# Patient Record
Sex: MALE | Race: WHITE | NOT HISPANIC OR LATINO | Employment: UNEMPLOYED | URBAN - METROPOLITAN AREA
[De-identification: names, ages, dates, MRNs, and addresses within clinical notes are randomized per-mention and may not be internally consistent; named-entity substitution may affect disease eponyms.]

---

## 2018-04-25 ENCOUNTER — OFFICE VISIT CONVERTED (OUTPATIENT)
Dept: UROLOGY | Facility: CLINIC | Age: 38
End: 2018-04-25
Attending: UROLOGY

## 2021-05-16 VITALS
WEIGHT: 217 LBS | SYSTOLIC BLOOD PRESSURE: 110 MMHG | HEIGHT: 70 IN | DIASTOLIC BLOOD PRESSURE: 82 MMHG | BODY MASS INDEX: 31.07 KG/M2

## 2023-11-22 ENCOUNTER — HOSPITAL ENCOUNTER (EMERGENCY)
Facility: HOSPITAL | Age: 43
Discharge: HOME | End: 2023-11-22
Attending: EMERGENCY MEDICINE | Admitting: EMERGENCY MEDICINE

## 2023-11-22 ENCOUNTER — APPOINTMENT (OUTPATIENT)
Dept: GENERAL RADIOLOGY | Facility: HOSPITAL | Age: 43
End: 2023-11-22

## 2023-11-22 VITALS
HEART RATE: 92 BPM | BODY MASS INDEX: 37.27 KG/M2 | TEMPERATURE: 97.9 F | SYSTOLIC BLOOD PRESSURE: 140 MMHG | HEIGHT: 70 IN | DIASTOLIC BLOOD PRESSURE: 91 MMHG | OXYGEN SATURATION: 100 % | WEIGHT: 260.36 LBS | RESPIRATION RATE: 20 BRPM

## 2023-11-22 DIAGNOSIS — M99.79 NARROWING OF INTERVERTEBRAL DISC SPACE: ICD-10-CM

## 2023-11-22 DIAGNOSIS — G89.28 CHRONIC NECK PAIN WITH HISTORY OF CERVICAL SPINAL SURGERY: ICD-10-CM

## 2023-11-22 DIAGNOSIS — Z98.890 CHRONIC NECK PAIN WITH HISTORY OF CERVICAL SPINAL SURGERY: ICD-10-CM

## 2023-11-22 DIAGNOSIS — M54.2 CHRONIC NECK PAIN WITH HISTORY OF CERVICAL SPINAL SURGERY: ICD-10-CM

## 2023-11-22 DIAGNOSIS — M40.202 KYPHOSIS OF CERVICAL REGION, UNSPECIFIED KYPHOSIS TYPE: ICD-10-CM

## 2023-11-22 DIAGNOSIS — M54.2 NECK PAIN: Primary | ICD-10-CM

## 2023-11-22 PROCEDURE — 25010000002 KETOROLAC TROMETHAMINE PER 15 MG

## 2023-11-22 PROCEDURE — 63710000001 PREDNISONE PER 1 MG

## 2023-11-22 PROCEDURE — 97110 THERAPEUTIC EXERCISES: CPT | Performed by: PHYSICAL THERAPIST

## 2023-11-22 PROCEDURE — 97161 PT EVAL LOW COMPLEX 20 MIN: CPT | Performed by: PHYSICAL THERAPIST

## 2023-11-22 PROCEDURE — 96372 THER/PROPH/DIAG INJ SC/IM: CPT

## 2023-11-22 PROCEDURE — 25010000002 ORPHENADRINE CITRATE PER 60 MG

## 2023-11-22 PROCEDURE — 72050 X-RAY EXAM NECK SPINE 4/5VWS: CPT

## 2023-11-22 PROCEDURE — 99283 EMERGENCY DEPT VISIT LOW MDM: CPT

## 2023-11-22 RX ORDER — PREDNISONE 20 MG/1
20 TABLET ORAL 2 TIMES DAILY
Qty: 10 TABLET | Refills: 0 | Status: SHIPPED | OUTPATIENT
Start: 2023-11-22 | End: 2023-11-27

## 2023-11-22 RX ORDER — ORPHENADRINE CITRATE 30 MG/ML
60 INJECTION INTRAMUSCULAR; INTRAVENOUS ONCE
Status: COMPLETED | OUTPATIENT
Start: 2023-11-22 | End: 2023-11-22

## 2023-11-22 RX ORDER — PREDNISONE 20 MG/1
60 TABLET ORAL ONCE
Status: COMPLETED | OUTPATIENT
Start: 2023-11-22 | End: 2023-11-22

## 2023-11-22 RX ORDER — KETOROLAC TROMETHAMINE 30 MG/ML
60 INJECTION, SOLUTION INTRAMUSCULAR; INTRAVENOUS ONCE
Status: COMPLETED | OUTPATIENT
Start: 2023-11-22 | End: 2023-11-22

## 2023-11-22 RX ORDER — ORPHENADRINE CITRATE 100 MG/1
100 TABLET, EXTENDED RELEASE ORAL 2 TIMES DAILY
Qty: 20 TABLET | Refills: 0 | Status: SHIPPED | OUTPATIENT
Start: 2023-11-22

## 2023-11-22 RX ORDER — KETOROLAC TROMETHAMINE 10 MG/1
10 TABLET, FILM COATED ORAL EVERY 6 HOURS PRN
Qty: 15 TABLET | Refills: 0 | Status: SHIPPED | OUTPATIENT
Start: 2023-11-22

## 2023-11-22 RX ADMIN — PREDNISONE 60 MG: 20 TABLET ORAL at 12:22

## 2023-11-22 RX ADMIN — ORPHENADRINE CITRATE 60 MG: 60 INJECTION INTRAMUSCULAR; INTRAVENOUS at 12:22

## 2023-11-22 RX ADMIN — KETOROLAC TROMETHAMINE 60 MG: 30 INJECTION, SOLUTION INTRAMUSCULAR; INTRAVENOUS at 12:22

## 2023-11-22 NOTE — THERAPY EVALUATION
Patient Name: Michel Valadez  : 1980    MRN: [id number]                              Today's Date: 2023       Admit Date: 2023    Visit Dx:     ICD-10-CM ICD-9-CM   1. Neck pain  M54.2 723.1     There is no problem list on file for this patient.    Past Medical History:   Diagnosis Date    Chronic back pain      Past Surgical History:   Procedure Laterality Date    DENTAL PROCEDURE        General Information       Row Name 23 1143          Physical Therapy Time and Intention    Document Type evaluation  -LR     Mode of Treatment individual therapy  -LR       Row Name 23 1143          General Information    Patient Profile Reviewed yes  -LR     Prior Level of Function independent:  -LR               User Key  (r) = Recorded By, (t) = Taken By, (c) = Cosigned By      Initials Name Provider Type    LR Sarah Amos, PT Physical Therapist                  History: Patient reports that yesterday and the day before he was lifting some heavy furniture and Sunil boxes and afterwards started experiencing pain in his neck.  He reports he has a history of a C5 fracture and has a fusion from C4-C6.  Patient states this happened when he was 14 years old.  He reports he is having pain in the lower part of his neck that is worse on the right side.  Pain is a 9/10.  He reports pain into both of his arms.  He states the pain is going all the way down to his fingers on the right and to his elbow on the left.  He also reports right hand numbness that was previously there.  Pain is increased with raising his arms overhead.    Objective:  Posture: Forward head, rounded shoulders    Palpation: Tender to palpation at cervical spinous processes, bilateral cervical paraspinals, bilateral upper trap    ROM:  Active Cervical ROM:  Flexion: 60  Extension: 0°  L Side bendin°  R Side bendin°  L Rotation: 20°  R Rotation: 50°    Active Shoulder ROM:  L Shoulder ROM:  R Shoulder ROM:  Flexion:  90°   Flexion: 90°  Abduction: 90°   Abduction: 90°  External Rotation: WNL External Rotation: WNL  Internal Rotation: WNL Internal Rotation: WNL     Strength:  L Shoulder MMT:   R Shoulder MMT:  Flexion: 3-   Flexion: 3-  Abduction: 3-   Abduction: 3-  External Rotation: 4-  External Rotation: 4-  Internal Rotation: 4-  Internal Rotation: 4-    L Elbow MMT:   R Elbow MMT:  Flexion: 5   Flexion: 5  Extension: 5   Extension: 5    L Wrist MMT:    R Wrist MMT:  Flexion: 5   Flexion: 4  Extension: 5   Extension: 4    Special Tests:  Spurlings: NT  Cervical Distraction Test: NT     Sensation: B UE sensation intact to light touch except for R C7 dermatome    Assessment/Plan:   Pt presents with a diagnosis of neck pain with and has decreased cervical ROM and decreased UE ROM that are limiting his ability to lift and turn his head.  The patient was educated in stretches for his neck to help improve ROM.  He was provided with a HEP handout.    Goals:   LTG 1: The patient will be independent in HEP in order to decrease pain and improve tolerance to functional activities.  STATUS: Met    Interventions:   Manual Therapy: Not performed    Therapeutic Exercises: HEP: Upper trap stretch, levator stretch, chin tuck, active cervical rotation, active cervical flexion/extension    Modalities: Not Performed     Outcome Measures       Row Name 11/22/23 1143          Optimal Instrument    Optimal Instrument Optimal - 3  -LR     Reaching 4  -LR     Lifting 4  -LR     Carrying 3  -LR     From the list, choose the 3 activities you would most like to be able to do without any difficulty Reaching;Lifting;Carrying  -LR     Total Score Optimal - 3 11  -LR       Row Name 11/22/23 1143          Functional Assessment    Outcome Measure Options Optimal Instrument  -LR               User Key  (r) = Recorded By, (t) = Taken By, (c) = Cosigned By      Initials Name Provider Type    Sarah Trivedi PT Physical Therapist                     Time  Calculation:   PT Evaluation Complexity  History, PT Evaluation Complexity: 1-2 personal factors and/or comorbidities  Examination of Body Systems (PT Eval Complexity): 1-2 elements  Clinical Presentation (PT Evaluation Complexity): stable  Clinical Decision Making (PT Evaluation Complexity): low complexity  Overall Complexity (PT Evaluation Complexity): low complexity     PT Charges       Row Name 11/22/23 1143             Time Calculation    PT Received On 11/22/23  -LR         Time Calculation- PT    Total Timed Code Minutes- PT 20 minute(s)  -LR         Timed Charges    85909 - PT Therapeutic Exercise Minutes 8  -LR         Untimed Charges    PT Eval/Re-eval Minutes 12  -LR         Total Minutes    Timed Charges Total Minutes 8  -LR      Untimed Charges Total Minutes 12  -LR       Total Minutes 20  -LR                User Key  (r) = Recorded By, (t) = Taken By, (c) = Cosigned By      Initials Name Provider Type    LR Sarah Amos, PT Physical Therapist                  Therapy Charges for Today       Code Description Service Date Service Provider Modifiers Qty    13392318189 HC PT THER PROC EA 15 MIN 11/22/2023 Sarah Amos, PT GP 1    28074187204 HC PT EVAL LOW COMPLEXITY 1 11/22/2023 Sarah Amos, PT GP 1            PT G-Codes  Outcome Measure Options: Optimal Instrument       Sarah Amos PT  11/22/2023

## 2023-11-22 NOTE — DISCHARGE INSTRUCTIONS
Your x-ray shows some moderate disc loss along your neck with previous surgery  Please follow-up with neuro spine    Muscle relaxers, steroids and anti-inflammatories to the pharmacy

## 2023-11-22 NOTE — ED PROVIDER NOTES
"Time: 12:08 PM EST  Date of encounter:  11/22/2023  Independent Historian/Clinical History and Information was obtained by:   Patient    History is limited by: N/A    Chief Complaint   Patient presents with    Neck Pain         History of Present Illness:  Patient is a 42 y.o. year old male who presents to the emergency department for evaluation of neck pain for the past couple days due to heavy lifting.  Patient states he has been moving and looking things on his shoulders and neck.  He has had prior cervical surgery in the past.  He has not taken Tylenol in the past couple days.  He denies recent fall to his neck.  Patient states he has numbness and tingling going down bilateral arms.    Patient Care Team  Primary Care Provider: Provider, No Known    Past Medical History:     No Known Allergies  Past Medical History:   Diagnosis Date    Chronic back pain      Past Surgical History:   Procedure Laterality Date    DENTAL PROCEDURE       History reviewed. No pertinent family history.    Home Medications:  Prior to Admission medications    Medication Sig Start Date End Date Taking? Authorizing Provider   ofloxacin (Ocuflox) 0.3 % ophthalmic solution 2 drops to right eye every 4 hours while awake for 48 hours.  Then 1 drop to right eye 4 times a day for 5 days. 7/12/21   Chencho Shaikh MD        Social History:   Social History     Substance Use Topics    Alcohol use: Not Currently    Drug use: Not Currently     Comment: In and out of prision for \"self medicating\"         Review of Systems:  Review of Systems   Constitutional: Negative.    HENT: Negative.     Eyes: Negative.    Respiratory: Negative.     Cardiovascular: Negative.    Gastrointestinal: Negative.    Endocrine: Negative.    Genitourinary: Negative.    Musculoskeletal:  Positive for neck pain.   Skin: Negative.    Allergic/Immunologic: Negative.    Neurological: Negative.    Hematological: Negative.    Psychiatric/Behavioral: Negative.      " 2136: Notified Dr. Echavarria about HR of 48. HR's have been 50s-70s on telemetry. Blood pressure is 120/37 on art line and 133/82 on cuff. Received new orders to notify if HR sustaining less than 45 bpm    0025: Notified Dr Echavarria of MAP in upper 50's from arterial line with pressures 110-120's/30-40's. Received new orders to notify provider if systolic BP is less than 100 mmHg.     "    Physical Exam:  /91 (BP Location: Left arm, Patient Position: Sitting)   Pulse 92   Temp 97.9 °F (36.6 °C) (Oral)   Resp 20   Ht 177.8 cm (70\")   Wt 118 kg (260 lb 5.8 oz)   SpO2 100%   BMI 37.36 kg/m²         Physical Exam  Vitals and nursing note reviewed.   Constitutional:       Appearance: Normal appearance. He is normal weight.   HENT:      Head: Normocephalic and atraumatic.      Nose: Nose normal.      Mouth/Throat:      Mouth: Mucous membranes are moist.   Eyes:      Extraocular Movements: Extraocular movements intact.      Conjunctiva/sclera: Conjunctivae normal.      Pupils: Pupils are equal, round, and reactive to light.   Neck:     Cardiovascular:      Rate and Rhythm: Normal rate and regular rhythm.      Heart sounds: Normal heart sounds.   Pulmonary:      Effort: Pulmonary effort is normal.      Breath sounds: Normal breath sounds.   Musculoskeletal:         General: Normal range of motion.      Cervical back: Normal range of motion and neck supple. Tenderness present. No erythema or rigidity. Spinous process tenderness present. No muscular tenderness. Normal range of motion.   Skin:     General: Skin is warm and dry.   Neurological:      General: No focal deficit present.      Mental Status: He is alert and oriented to person, place, and time.   Psychiatric:         Mood and Affect: Mood normal.         Behavior: Behavior normal.                   Procedures:  Procedures      Medical Decision Making:      Comorbidities that affect care:    Chronic back pain and neck pain    External Notes reviewed:    Previous Clinic Note: Care visit from July 2021 for acute conjunctival ight eye      The following orders were placed and all results were independently analyzed by me:  Orders Placed This Encounter   Procedures    XR Spine Cervical Complete 4 or 5 View    PT Consult: Eval & Treat Functional Mobility Below Baseline    PT Plan of Care Cert / Re-Cert       Medications Given in the " Emergency Department:  Medications   ketorolac (TORADOL) injection 60 mg (60 mg Intramuscular Given 11/22/23 1222)   orphenadrine (NORFLEX) injection 60 mg (60 mg Intramuscular Given 11/22/23 1222)   predniSONE (DELTASONE) tablet 60 mg (60 mg Oral Given 11/22/23 1222)        ED Course:    The patient was initially evaluated in the triage area where orders were placed. The patient was later dispositioned by Nick Roberts PA-C.      The patient was advised to stay for completion of workup which includes but is not limited to communication of labs and radiological results, reassessment and plan. The patient was advised that leaving prior to disposition by a provider could result in critical findings that are not communicated to the patient.     ED Course as of 11/22/23 1253   Wed Nov 22, 2023   1249 Xray Showing some disc height loss.  Discussed with the patient we will refer him to neuro spine. [AJ]      ED Course User Index  [AJ] Nick Roberts PA-C       Labs:    Lab Results (last 24 hours)       ** No results found for the last 24 hours. **             Imaging:    XR Spine Cervical Complete 4 or 5 View    Result Date: 11/22/2023  PROCEDURE: XR SPINE CERVICAL COMPLETE 4 OR 5 VW  COMPARISON: None  INDICATIONS: chronic neck pain, increased neck pain after working  FINDINGS:  Cervical spine seen to the top of T1.  There is kyphosis.  There is partial fusion of the C4-C5 vertebral bodies in there is osseous fusion of the posterior elements with hardware at the spinous processes.  There is mild disc height loss at the C5-C6 level.  Moderate disc height loss at C6-C7.  Mild disc height loss at C7-T1.  Prevertebral soft tissues are normal.  There is no osseous neural foraminal narrowing.  Lung apices are clear.  Lateral masses of C1 align body C2.       Postoperative changes at the C4-C5 level.  Moderate disc height loss C6-C7.  Mild disc height loss at C5-C6 and C7-T1.  Cervical spine kyphosis.     EMMY KLEIN  MD       Electronically Signed and Approved By: EMMY KLEIN MD on 11/22/2023 at 12:33                Differential Diagnosis and Discussion:      Neck Pain: The patient presents with neck pain. My differential diagnosis includes but is not limited to acute spinal epidural abscess, acute spinal epidural bleed, meningitis, musculoskeletal neck pain, spinal fracture, and osteoarthritis.     All X-rays impressions were independently interpreted by me.    [facility]               Patient Care Considerations:    NARCOTICS: I considered prescribing opiate pain medication as an outpatient, however x-ray is negative for any fractures or dislocations.  Will refer patient to neuro spine.      Consultants/Shared Management Plan:    None    Social Determinants of Health:    Patient is independent, reliable, and has access to care.       Disposition and Care Coordination:    Discharged: The patient is suitable and stable for discharge with no need for consideration of observation or admission.    I have explained the patient´s condition, diagnoses and treatment plan based on the information available to me at this time. I have answered questions and addressed any concerns. The patient has a good  understanding of the patient´s diagnosis, condition, and treatment plan as can be expected at this point. The vital signs have been stable. The patient´s condition is stable and appropriate for discharge from the emergency department.      The patient will pursue further outpatient evaluation with the primary care physician or other designated or consulting physician as outlined in the discharge instructions. They are agreeable to this plan of care and follow-up instructions have been explained in detail. The patient has received these instructions in written format and have expressed an understanding of the discharge instructions. The patient is aware that any significant change in condition or worsening of symptoms should prompt an immediate  return to this or the closest emergency department or call to 911.  I have explained discharge medications and the need for follow up with the patient/caretakers. This was also printed in the discharge instructions. Patient was discharged with the following medications and follow up:      Medication List        New Prescriptions      ketorolac 10 MG tablet  Commonly known as: TORADOL  Take 1 tablet by mouth Every 6 (Six) Hours As Needed for Moderate Pain.     orphenadrine 100 MG 12 hr tablet  Commonly known as: NORFLEX  Take 1 tablet by mouth 2 (Two) Times a Day.     predniSONE 20 MG tablet  Commonly known as: DELTASONE  Take 1 tablet by mouth 2 (Two) Times a Day for 5 days.               Where to Get Your Medications        These medications were sent to [facility] - [place], [place] - [address] - [phone]  - [phone]   [address] [facility] [address]      Phone: [phone]   ketorolac 10 MG tablet  orphenadrine 100 MG 12 hr tablet  predniSONE 20 MG tablet      Lester Muller MD  [address] [place] [address] [phone]    Schedule an appointment as soon as possible for a visit          Final diagnoses:   Narrowing of intervertebral disc space   Kyphosis of cervical region, unspecified kyphosis type   Chronic neck pain with history of cervical spinal surgery        ED Disposition       ED Disposition   Discharge    Condition   Stable    Comment   --               This medical record created using voice recognition software.             Nick Roberts PA-C  11/22/23 0138

## 2023-11-22 NOTE — Clinical Note
[facility] EMERGENCY ROOM  [address]  [place] [address]  Phone: [phone]    Michel Valadez was seen and treated in our emergency department on 11/22/2023.  He may return to work on 11/23/2023.  Was seen in the ER for evaluation of neck pain        Thank you for choosing [facility].    Nick Roberts, MARIELA